# Patient Record
Sex: FEMALE | Race: WHITE | NOT HISPANIC OR LATINO
[De-identification: names, ages, dates, MRNs, and addresses within clinical notes are randomized per-mention and may not be internally consistent; named-entity substitution may affect disease eponyms.]

---

## 2021-01-20 ENCOUNTER — LABORATORY RESULT (OUTPATIENT)
Age: 36
End: 2021-01-20

## 2021-01-20 ENCOUNTER — APPOINTMENT (OUTPATIENT)
Dept: OBGYN | Facility: CLINIC | Age: 36
End: 2021-01-20
Payer: COMMERCIAL

## 2021-01-20 VITALS
SYSTOLIC BLOOD PRESSURE: 105 MMHG | HEART RATE: 71 BPM | HEIGHT: 66 IN | BODY MASS INDEX: 20.96 KG/M2 | WEIGHT: 130.4 LBS | DIASTOLIC BLOOD PRESSURE: 65 MMHG

## 2021-01-20 DIAGNOSIS — Z23 ENCOUNTER FOR IMMUNIZATION: ICD-10-CM

## 2021-01-20 DIAGNOSIS — Z80.0 FAMILY HISTORY OF MALIGNANT NEOPLASM OF DIGESTIVE ORGANS: ICD-10-CM

## 2021-01-20 DIAGNOSIS — Z01.419 ENCOUNTER FOR GYNECOLOGICAL EXAMINATION (GENERAL) (ROUTINE) W/OUT ABNORMAL FINDINGS: ICD-10-CM

## 2021-01-20 DIAGNOSIS — Z11.3 ENCOUNTER FOR SCREENING FOR INFECTIONS WITH A PREDOMINANTLY SEXUAL MODE OF TRANSMISSION: ICD-10-CM

## 2021-01-20 DIAGNOSIS — Z87.42 PERSONAL HISTORY OF OTHER DISEASES OF THE FEMALE GENITAL TRACT: ICD-10-CM

## 2021-01-20 PROBLEM — Z00.00 ENCOUNTER FOR PREVENTIVE HEALTH EXAMINATION: Status: ACTIVE | Noted: 2021-01-20

## 2021-01-20 PROCEDURE — 99072 ADDL SUPL MATRL&STAF TM PHE: CPT

## 2021-01-20 PROCEDURE — 99395 PREV VISIT EST AGE 18-39: CPT

## 2021-01-20 PROCEDURE — 99385 PREV VISIT NEW AGE 18-39: CPT

## 2021-01-20 RX ORDER — HUMAN PAPILLOMAVIRUS 9-VALENT VACCINE, RECOMBINANT 30; 40; 60; 40; 20; 20; 20; 20; 20 UG/.5ML; UG/.5ML; UG/.5ML; UG/.5ML; UG/.5ML; UG/.5ML; UG/.5ML; UG/.5ML; UG/.5ML
INJECTION, SUSPENSION INTRAMUSCULAR
Qty: 1 | Refills: 2 | Status: ACTIVE | COMMUNITY
Start: 2021-01-20 | End: 1900-01-01

## 2021-01-23 LAB — HPV HIGH+LOW RISK DNA PNL CVX: DETECTED

## 2021-01-24 PROBLEM — Z87.42 HISTORY OF ABNORMAL CERVICAL PAPANICOLAOU SMEAR: Status: RESOLVED | Noted: 2021-01-24 | Resolved: 2021-01-24

## 2021-01-24 NOTE — HISTORY OF PRESENT ILLNESS
[Patient reported PAP Smear was abnormal] : Patient reported PAP Smear was abnormal [Y] : Patient is sexually active [N] : Patient denies prior pregnancies [FreeTextEntry1] : doing well; s/p LEEP\par had bed bugs in new apt. now living in hotel\par skipped one period; h/o pcos?\par but would skip during times of stress/anxiety\par + SA; using condoms; new partner; had std testing prior [PapSmeardate] : 1/2019 [LMPDate] : 12/6/20 [TextBox_31] : CRISTY [MensesFreq] : 37 [MensesLength] : 5-6

## 2021-01-24 NOTE — PHYSICAL EXAM
[Appropriately responsive] : appropriately responsive [Alert] : alert [No Acute Distress] : no acute distress [No Lymphadenopathy] : no lymphadenopathy [No Murmurs] : no murmurs [Soft] : soft [Non-tender] : non-tender [Non-distended] : non-distended [No HSM] : No HSM [No Lesions] : no lesions [No Mass] : no mass [Oriented x3] : oriented x3 [Examination Of The Breasts] : a normal appearance [Labia Majora] : normal [Normal] : normal [Tenderness] : nontender [Enlarged ___ wks] : not enlarged [Mass ___ cm] : no uterine mass was palpated [Uterine Adnexae] : normal [FreeTextEntry1] : discoloration of perineum (h/o vulvar biopsy-neg)

## 2021-02-03 LAB — CYTOLOGY CVX/VAG DOC THIN PREP: NORMAL

## 2021-03-01 ENCOUNTER — APPOINTMENT (OUTPATIENT)
Dept: OBGYN | Facility: CLINIC | Age: 36
End: 2021-03-01

## 2021-05-03 ENCOUNTER — APPOINTMENT (OUTPATIENT)
Dept: OBGYN | Facility: CLINIC | Age: 36
End: 2021-05-03

## 2021-05-26 ENCOUNTER — APPOINTMENT (OUTPATIENT)
Dept: OBGYN | Facility: CLINIC | Age: 36
End: 2021-05-26
Payer: COMMERCIAL

## 2021-05-26 VITALS
DIASTOLIC BLOOD PRESSURE: 65 MMHG | WEIGHT: 131 LBS | SYSTOLIC BLOOD PRESSURE: 95 MMHG | BODY MASS INDEX: 21.05 KG/M2 | HEIGHT: 66 IN

## 2021-05-26 PROCEDURE — 90471 IMMUNIZATION ADMIN: CPT

## 2021-05-26 PROCEDURE — 90649 4VHPV VACCINE 3 DOSE IM: CPT | Mod: NC

## 2021-05-26 PROCEDURE — 99072 ADDL SUPL MATRL&STAF TM PHE: CPT

## 2021-05-26 NOTE — HISTORY OF PRESENT ILLNESS
[FreeTextEntry1] : doing well; had covid vaccines\par here for 2nd dose gardasil\par no complications last dose

## 2021-08-16 ENCOUNTER — APPOINTMENT (OUTPATIENT)
Dept: OBGYN | Facility: CLINIC | Age: 36
End: 2021-08-16

## 2021-09-13 ENCOUNTER — APPOINTMENT (OUTPATIENT)
Dept: OBGYN | Facility: CLINIC | Age: 36
End: 2021-09-13
Payer: COMMERCIAL

## 2021-09-13 VITALS
SYSTOLIC BLOOD PRESSURE: 102 MMHG | WEIGHT: 130 LBS | BODY MASS INDEX: 20.89 KG/M2 | HEIGHT: 66 IN | HEART RATE: 72 BPM | DIASTOLIC BLOOD PRESSURE: 68 MMHG

## 2021-09-13 DIAGNOSIS — N76.0 ACUTE VAGINITIS: ICD-10-CM

## 2021-09-13 PROCEDURE — 99212 OFFICE O/P EST SF 10 MIN: CPT

## 2021-09-13 NOTE — PLAN
[FreeTextEntry1] : review of perineal care;\par skin dermatitis likely from recent yeast infection;\par skin protectant\par vag probe done but normal discharge

## 2021-09-13 NOTE — HISTORY OF PRESENT ILLNESS
[FreeTextEntry1] : yeast infections recently; + SA same partner;\par had bad infxn over 1 month ago; treated but still feeling irritated;\par no new meds; no new soaps

## 2021-09-13 NOTE — PHYSICAL EXAM
[Appropriately responsive] : appropriately responsive [FreeTextEntry1] : mild irritation of vulva; no lesions; same dark skin changes on posterior fourchette

## 2021-09-21 RX ORDER — HUMAN PAPILLOMAVIRUS 9-VALENT VACCINE, RECOMBINANT 30; 40; 60; 40; 20; 20; 20; 20; 20 UG/.5ML; UG/.5ML; UG/.5ML; UG/.5ML; UG/.5ML; UG/.5ML; UG/.5ML; UG/.5ML; UG/.5ML
INJECTION, SUSPENSION INTRAMUSCULAR
Qty: 1 | Refills: 2 | Status: ACTIVE | COMMUNITY
Start: 2021-09-21 | End: 1900-01-01

## 2021-09-22 ENCOUNTER — APPOINTMENT (OUTPATIENT)
Dept: OBGYN | Facility: CLINIC | Age: 36
End: 2021-09-22

## 2021-09-24 ENCOUNTER — APPOINTMENT (OUTPATIENT)
Dept: OBGYN | Facility: CLINIC | Age: 36
End: 2021-09-24

## 2021-09-24 PROCEDURE — XXXXX: CPT | Mod: 1L

## 2022-02-03 ENCOUNTER — APPOINTMENT (OUTPATIENT)
Dept: OBGYN | Facility: CLINIC | Age: 37
End: 2022-02-03
Payer: COMMERCIAL

## 2022-02-03 VITALS
SYSTOLIC BLOOD PRESSURE: 118 MMHG | DIASTOLIC BLOOD PRESSURE: 78 MMHG | HEIGHT: 66 IN | BODY MASS INDEX: 20.89 KG/M2 | WEIGHT: 130 LBS

## 2022-02-03 PROCEDURE — 99395 PREV VISIT EST AGE 18-39: CPT

## 2022-02-05 NOTE — PHYSICAL EXAM

## 2022-02-05 NOTE — HISTORY OF PRESENT ILLNESS
[Withdrawal] : uses withdrawal [Y] : Patient is sexually active [N] : Patient denies prior pregnancies [FreeTextEntry1] : doing well; same partner declines testing\par menses more normal [PapSmeardate] : 01/2021 [LMPDate] : 01/03/2022 [MensesFreq] : 33 [MensesLength] : 6 [PGHxTotal] : 0 [PGHxFullTerm] : 0 [PGHxPremature] : 0 [PGHxAbortions] : 0 [BannerxLiving] : 0 [PGHxABInduced] : 0 [PGHxABSpont] : 0 [PGHxEctopic] : 0 [PGHxMultBirths] : 0

## 2022-03-03 LAB
CYTOLOGY CVX/VAG DOC THIN PREP: NORMAL
HPV HIGH+LOW RISK DNA PNL CVX: NOT DETECTED

## 2023-10-16 ENCOUNTER — APPOINTMENT (OUTPATIENT)
Dept: OBGYN | Facility: CLINIC | Age: 38
End: 2023-10-16
Payer: COMMERCIAL

## 2023-10-16 DIAGNOSIS — B37.9 CANDIDIASIS, UNSPECIFIED: ICD-10-CM

## 2023-10-16 DIAGNOSIS — Z31.89 ENCOUNTER FOR OTHER PROCREATIVE MANAGEMENT: ICD-10-CM

## 2023-10-16 PROCEDURE — 99395 PREV VISIT EST AGE 18-39: CPT

## 2023-10-16 RX ORDER — FLUCONAZOLE 150 MG/1
150 TABLET ORAL
Qty: 2 | Refills: 1 | Status: ACTIVE | COMMUNITY
Start: 2023-10-16 | End: 1900-01-01

## 2023-10-17 LAB
ANTI-MUELLERIAN HORMONE: 6.68 NG/ML
CANDIDA VAG CYTO: NOT DETECTED
G VAGINALIS+PREV SP MTYP VAG QL MICRO: NOT DETECTED
HPV HIGH+LOW RISK DNA PNL CVX: NOT DETECTED
T VAGINALIS VAG QL WET PREP: NOT DETECTED

## 2023-10-21 LAB — CYTOLOGY CVX/VAG DOC THIN PREP: NORMAL

## 2023-11-08 ENCOUNTER — TRANSCRIPTION ENCOUNTER (OUTPATIENT)
Age: 38
End: 2023-11-08

## 2024-10-21 ENCOUNTER — APPOINTMENT (OUTPATIENT)
Dept: OBGYN | Facility: CLINIC | Age: 39
End: 2024-10-21

## 2024-12-12 ENCOUNTER — NON-APPOINTMENT (OUTPATIENT)
Age: 39
End: 2024-12-12

## 2024-12-12 ENCOUNTER — APPOINTMENT (OUTPATIENT)
Dept: OBGYN | Facility: CLINIC | Age: 39
End: 2024-12-12
Payer: COMMERCIAL

## 2024-12-12 VITALS
OXYGEN SATURATION: 96 % | WEIGHT: 134.19 LBS | BODY MASS INDEX: 21.57 KG/M2 | SYSTOLIC BLOOD PRESSURE: 102 MMHG | DIASTOLIC BLOOD PRESSURE: 69 MMHG | HEART RATE: 62 BPM | HEIGHT: 66 IN

## 2024-12-12 DIAGNOSIS — Z31.69 ENCOUNTER FOR OTHER GENERAL COUNSELING AND ADVICE ON PROCREATION: ICD-10-CM

## 2024-12-12 DIAGNOSIS — Z86.19 PERSONAL HISTORY OF OTHER INFECTIOUS AND PARASITIC DISEASES: ICD-10-CM

## 2024-12-12 PROCEDURE — 99395 PREV VISIT EST AGE 18-39: CPT

## 2024-12-13 LAB
HPV HIGH+LOW RISK DNA PNL CVX: NOT DETECTED
MEV IGG FLD QL IA: 21.8 AU/ML
MEV IGG+IGM SER-IMP: POSITIVE
RUBV IGG FLD-ACNC: 3.17 INDEX
RUBV IGG SER-IMP: POSITIVE
TSH SERPL-ACNC: 2.15 UIU/ML
VZV AB TITR SER: POSITIVE
VZV IGG SER IF-ACNC: 3.6 S/CO

## 2024-12-19 LAB — CYTOLOGY CVX/VAG DOC THIN PREP: NORMAL

## 2025-01-28 ENCOUNTER — NON-APPOINTMENT (OUTPATIENT)
Age: 40
End: 2025-01-28

## 2025-01-28 ENCOUNTER — APPOINTMENT (OUTPATIENT)
Dept: COLORECTAL SURGERY | Facility: CLINIC | Age: 40
End: 2025-01-28
Payer: COMMERCIAL

## 2025-01-28 VITALS
TEMPERATURE: 98.1 F | DIASTOLIC BLOOD PRESSURE: 71 MMHG | SYSTOLIC BLOOD PRESSURE: 107 MMHG | BODY MASS INDEX: 21.21 KG/M2 | HEART RATE: 68 BPM | WEIGHT: 132 LBS | HEIGHT: 66 IN

## 2025-01-28 DIAGNOSIS — Z11.51 ENCOUNTER FOR SCREENING FOR HUMAN PAPILLOMAVIRUS (HPV): ICD-10-CM

## 2025-01-28 PROCEDURE — 99203 OFFICE O/P NEW LOW 30 MIN: CPT | Mod: 25

## 2025-01-28 PROCEDURE — 46600 DIAGNOSTIC ANOSCOPY SPX: CPT

## 2025-01-30 ENCOUNTER — APPOINTMENT (OUTPATIENT)
Dept: OTOLARYNGOLOGY | Facility: CLINIC | Age: 40
End: 2025-01-30

## 2025-01-31 ENCOUNTER — NON-APPOINTMENT (OUTPATIENT)
Age: 40
End: 2025-01-31

## 2025-01-31 LAB — ANAL PAP CYTOLOGY: NORMAL
